# Patient Record
(demographics unavailable — no encounter records)

---

## 2025-02-01 NOTE — ADDENDUM
[FreeTextEntry1] :  Melissa KULKARNI assisted in documentation on 01/21/2025  acting as a scribe for Dr. Lucas Colon.

## 2025-02-01 NOTE — END OF VISIT
[FreeTextEntry3] : Parts of this note were generated by using [employer] services and Dragon medical dictation software.  A reasonable effort was made to proofread and correct its contents, but typos and mistakes may still remain. If there are any questions or points of clarification needed, please contact my office.   Prior to appointment and during encounter with patient extensive medical records were reviewed including but not limited to, hospital records, outpatient records, imaging results and lab data if available. During this appointment the patient was examined, diagnoses were discussed and explained in a face-to-face manner. In addition, extensive time was spent reviewing aforementioned diagnostic studies. Counseling including test results, differential diagnoses, treatment options, risk and benefits, lifestyle changes, current condition, and current medications was performed. Patient's questions and concerns were addressed. Patient verbalized understanding of the treatment plan. Time spent is for reviewing chart, labs and images if available, counseling and care coordination.  [Time Spent: ___ minutes] : I have spent [unfilled] minutes of time on the encounter which excludes teaching and separately reported services.

## 2025-02-01 NOTE — HISTORY OF PRESENT ILLNESS
[FreeTextEntry1] : Primary care physician: Dr. Ashish Alfredo   01/21/2025: 65-year-old male presents for follow-up.  Has not followed with PCP in a long time.  Since last visit in 09/2024, patient has been taking Flomax two capsules and Finasteride. Has not had any other bother with urination.   Reports reasonable stream, daytime frequency 5-6 times every 2-4 hours depending on fluids. Nocturia 1 time.  Does not have hesitancy or bother with urination.  Is able to attain and maintain erections. However, does not have any ejaculation.   Also has prolonged ejaculation.   Father has history of prostate cancer.   Had trial of void 9/24/2024.   Seen on 9/20/2024 for urinary retention.   Patient again had developed urinary retention. After last visit on 9/17/2024 when he had trial of void patient became sexually active and after activity developed urinary retention.  Again, went to [facility] and indwelling Romero catheter was placed.  Seen on 9/10/2024 for urinary retention. Mentioned that last week had the flu/COVID and was taking cold medicine.  Last night, patient was unable to urinate and went to [facility] this morning where Romero catheter was inserted.   Patient was taking Flomax and Finasteride, and urination was the same. No change until this happened.    At this point, patient would like to consider surgical options for his enlarged prostate.   In the past: On Flomax and Finasteride.  Reported variable stream, daytime frequency 5 to 6 x and nocturia 1 x.  Denied hesitancy, straining, intermittency, urgency, incontinence, sense of incomplete emptying.   Had trial of void on 10/06/2023. Since then, urination is back to baseline.   Seen on 10/06/2023 for urinary retention. Patient was taking over the counter protein product, felt stream was slowing.  Went into urinary retention and Romero catheter placed 10 days ago.  No longer taking over-the-counter protein product.  Taking Flomax and Finasteride,  Romero catheter attached to leg bag draining clean urine.   Seen on 3/7/2023. On Flomax and Finasteride (regularly since 9/22/22).  Had reasonable stream, daytime frequency every 2 to 3 hours or so, nocturia 1 to 2 x.  Denied hesitancy, straining, intermittency, urgency, incontinence, sense of incomplete emptying.  Denied dysuria, hematuria, lower abdominal or flank pain, nausea, vomiting, fever, chills or rigors.  Normal libido and erections.  Complained of on and off not having ejaculation.   Has history of Urinary retention in the past. Father has Prostate cancer.   Followed with Dr Bruce in the past.

## 2025-02-01 NOTE — ASSESSMENT
[FreeTextEntry1] : Benign Prostatic Hyperplasia: Patient was planned to undergo cystoscopy and prostate ultrasound today.  However, at this time, patient is questioning if he needs to proceed, considering that he has not had any issues.   PVR today was 34 ml.  Will get urinalysis and urine culture.  Will get BMP.  Discussed treatment options. Will continue Flomax two capsules and Finasteride.    PSA screening: Discussed PSA screening and latest recommendations/guidelines: USPTF and AUA. Mentioned as per comprehensive review and meta-analysis digital rectal exam exhibited a notably low diagnostic value for prostate cancer detection. With suggestion that it could be potentially omitted. Patient was agreeable with not doing digital rectal examination.  Will continue PSA screening.  Will get PSA.  Return to office in 6 months or sooner if there are any issues. Will do uroflow and check PVR.

## 2025-02-01 NOTE — PHYSICAL EXAM
[Normal Appearance] : normal appearance [General Appearance - In No Acute Distress] : no acute distress [] : no respiratory distress [Oriented To Time, Place, And Person] : oriented to person, place, and time [FreeTextEntry1] : normal peripheral circulation  [Abdomen Soft] : soft [Abdomen Tenderness] : non-tender [de-identified] : Romero to leg bag draining clear urine

## 2025-02-01 NOTE — END OF VISIT
[FreeTextEntry3] : Parts of this note were generated by using [employer] services and Dragon medical dictation software.  A reasonable effort was made to proofread and correct its contents, but typos and mistakes may still remain. If there are any questions or points of clarification needed, please contact my office.   Prior to appointment and during encounter with patient extensive medical records were reviewed including but not limited to, hospital records, outpatient records, imaging results and lab data if available. During this appointment the patient was examined, diagnoses were discussed and explained in a face-to-face manner. In addition, extensive time was spent reviewing aforementioned diagnostic studies. Counseling including test results, differential diagnoses, treatment options, risk and benefits, lifestyle changes, current condition, and current medications was performed. Patient's questions and concerns were addressed. Patient verbalized understanding of the treatment plan. Time spent is for reviewing chart, labs and images if available, counseling and care coordination.  [Time Spent: ___ minutes] : I have spent [unfilled] minutes of time on the encounter which excludes teaching and separately reported services.

## 2025-02-01 NOTE — HISTORY OF PRESENT ILLNESS
[FreeTextEntry1] : Primary care physician: Dr. Ashish Alfredo   01/21/2025: 65-year-old male presents for follow-up.  Has not followed with PCP in a long time.  Since last visit in 09/2024, patient has been taking Flomax two capsules and Finasteride. Has not had any other bother with urination.   Reports reasonable stream, daytime frequency 5-6 times every 2-4 hours depending on fluids. Nocturia 1 time.  Does not have hesitancy or bother with urination.  Is able to attain and maintain erections. However, does not have any ejaculation.   Also has prolonged ejaculation.   Father has history of prostate cancer.   Had trial of void 9/24/2024.   Seen on 9/20/2024 for urinary retention.   Patient again had developed urinary retention. After last visit on 9/17/2024 when he had trial of void patient became sexually active and after activity developed urinary retention.  Again, went to [facility] and indwelling Romero catheter was placed.  Seen on 9/10/2024 for urinary retention. Mentioned that last week had the flu/COVID and was taking cold medicine.  Last night, patient was unable to urinate and went to [facility] this morning where Romero catheter was inserted.   Patient was taking Flomax and Finasteride, and urination was the same. No change until this happened.    At this point, patient would like to consider surgical options for his enlarged prostate.   In the past: On Flomax and Finasteride.  Reported variable stream, daytime frequency 5 to 6 x and nocturia 1 x.  Denied hesitancy, straining, intermittency, urgency, incontinence, sense of incomplete emptying.   Had trial of void on 10/06/2023. Since then, urination is back to baseline.   Seen on 10/06/2023 for urinary retention. Patient was taking over the counter protein product, felt stream was slowing.  Went into urinary retention and Romero catheter placed 10 days ago.  No longer taking over-the-counter protein product.  Taking Flomax and Finasteride,  Romero catheter attached to leg bag draining clean urine.   Seen on 3/7/2023. On Flomax and Finasteride (regularly since 9/22/22).  Had reasonable stream, daytime frequency every 2 to 3 hours or so, nocturia 1 to 2 x.  Denied hesitancy, straining, intermittency, urgency, incontinence, sense of incomplete emptying.  Denied dysuria, hematuria, lower abdominal or flank pain, nausea, vomiting, fever, chills or rigors.  Normal libido and erections.  Complained of on and off not having ejaculation.   Has history of Urinary retention in the past. Father has Prostate cancer.   Followed with Dr Bruce in the past.

## 2025-02-01 NOTE — PHYSICAL EXAM
[Normal Appearance] : normal appearance [General Appearance - In No Acute Distress] : no acute distress [] : no respiratory distress [Oriented To Time, Place, And Person] : oriented to person, place, and time [FreeTextEntry1] : normal peripheral circulation  [Abdomen Soft] : soft [Abdomen Tenderness] : non-tender [de-identified] : Romero to leg bag draining clear urine

## 2025-03-11 NOTE — REVIEW OF SYSTEMS
Normal result letter created and pended   [Regular Periods] : regular periods [Headaches] : no headaches [Polyuria] : no polyuria [Polydipsia] : no polydipsia [Constipation] : no constipation [Fatigue] : no fatigue [Abdominal Pain] : no abdominal pain [see HPI] : see HPI [Negative] : Heme/Lymph [Vomiting] : no vomiting [FreeTextEntry2] : Ted Costello is a now 19 year 2 month old female here for follow-up of Graves' disease. \par She was first seen by my colleague Dr. Hollingsworth June 30, 2017 and transferred care to me in [place]. She was found to have high HR and weight loss and results were consistent with thyrotoxicosis. Labs showed hyperthyroidism and positive TSI and TSHrAb with Dr. Hollingsworth and after discussion they were interested in medication treatment with atenolol and methimazole. Initially she had required multiple dosage increase, and I reviewed if her compliance is significantly poor it is akin to failure of medical management after which she has had good compliance. Atenolol was stopped after 9/11/17 and her methimazole has decreased to 5 mg daily since November 2017. I last saw her 4/15/2019 for follow-up, when her results showed TSH was slightly elevated and I recommended to decrease to 2.5 mg daily of methimazole. I did discuss 2 years after treatment we can trial wean but did not say she should stop treatment.\par July 2019 she missed several appointments, reportedly due to school activities but this was during the summer time. I reviewed she was adult age when they called, and I reviewed she needed to stay consistent with treatment and see adult endocrine. \par Subsequently they made a few other appointments but she again missed them. She was supposed to present 2 weeks ago but due to COVID19 pandemic her appointment was erroneously cancelled. \par \par Today mother's first question is whether she has hypo or hyperthyroidism. Mother reported that she has not taken the medication recently, and Ted Costello admits she has not taken it since 6 months ago. She states she ran out and admits did not tell mother or contact our office. She states she did not have good reason for missing the more recent appointments she just did not get out of bed on time. I asked if she does want to stay healthy she states she does. \par Mother states she has heard with this she cannot get pregnant and wants to know if it is true.  [FreeTextEntry1] : Menarche at 13 years of age, Has been monthly LMP 3/21

## 2025-03-17 NOTE — ADDENDUM
[FreeTextEntry1] :  Melissa KULKARNI assisted in documentation on 03/11/2025  acting as a scribe for Dr. Lucas Colon.

## 2025-03-17 NOTE — ASSESSMENT
[FreeTextEntry1] : Benign Prostatic Hyperplasia: Discussed treatment options.  Reviewed the images.  Discussed concern for enlarged median lobe and its effect. Discussed treatment options, including surgical procedures: robotic assisted laparoscopic simple prostatectomy versus holmium laser enucleation of prostate versus aquablation.   Risks versus benefits of each options discussed.   At this time, patient will continue Flomax two capsules and Finasteride.   Recommended to take Flomax after his usual time of sexual activity.     Elevated PSA: Discussed MRI prostate findings.  Will continue PSA monitoring: will do free and total PSA before follow-up appointment.   Return to office in 6 months or sooner if there are any issues. Will do uroflow and check PVR.

## 2025-03-17 NOTE — HISTORY OF PRESENT ILLNESS
[FreeTextEntry1] : Primary care physician: Dr. sAhish Alfredo   03/11/2025: 65-year-old male presents for discussion of MRI prostate.    After last visit, patient did do labs including PSA, which was elevated. Patient subsequently had MRI prostate. MRI prostate 02/28/2025: Prostate volume: 143 cc No MRI-targetable lesion. PIRADS 2.  01/22/2025:  Urine culture: no growth.  Creatinine: 0.90  Urinalysis: negative   PSA: 3.48/24%.  Corrected PSA: 6.96 considering patient is on Finasteride.    Patient is taking Flomax two capsules and Finasteride.  Has variable stream. Daytime frequency every 2-3 hours and nocturia 1-2 times.   No other bother.  Denies dysuria, hematuria, lower abdominal or flank pain, nausea, vomiting, fever, chills or rigors.  Patient reports no ejaculation since going on Flomax two capsules. Patient takes Flomax in the morning and sexual activity is usually in the afternoon.    Seen on 01/21/2025 for follow-up.  On Flomax two capsules and Finasteride. Had reasonable stream, daytime frequency 5-6 times every 2-4 hours depending on fluids. Nocturia 1 time.  Did not have hesitancy or bother with urination.  Is able to attain and maintain erections. However, did not have any ejaculation.   Also had prolonged ejaculation.   Father had history of prostate cancer.   Had trial of void 9/24/2024.   Seen on 9/20/2024 for urinary retention.   Patient again had developed urinary retention. After last visit on 9/17/2024 when he had trial of void patient became sexually active and after activity developed urinary retention.  Again, went to [facility] and indwelling Romero catheter was placed.  Seen on 9/10/2024 for urinary retention. Mentioned that last week had the flu/COVID and was taking cold medicine.  Last night, patient was unable to urinate and went to [facility] this morning where Romero catheter was inserted.   Patient was taking Flomax and Finasteride, and urination was the same. No change until this happened.    At this point, patient would like to consider surgical options for his enlarged prostate.   In the past: On Flomax and Finasteride.  Reported variable stream, daytime frequency 5 to 6 x and nocturia 1 x.  Denied hesitancy, straining, intermittency, urgency, incontinence, sense of incomplete emptying.   Had trial of void on 10/06/2023. Since then, urination is back to baseline.   Seen on 10/06/2023 for urinary retention. Patient was taking over the counter protein product, felt stream was slowing.  Went into urinary retention and Romero catheter placed 10 days ago.  No longer taking over-the-counter protein product.  Taking Flomax and Finasteride,  Romero catheter attached to leg bag draining clean urine.   Seen on 3/7/2023. On Flomax and Finasteride (regularly since 9/22/22).  Had reasonable stream, daytime frequency every 2 to 3 hours or so, nocturia 1 to 2 x.  Denied hesitancy, straining, intermittency, urgency, incontinence, sense of incomplete emptying.  Denied dysuria, hematuria, lower abdominal or flank pain, nausea, vomiting, fever, chills or rigors.  Normal libido and erections.  Complained of on and off not having ejaculation.   Has history of Urinary retention in the past. Father has Prostate cancer.   Followed with Dr Bruce in the past.

## 2025-03-17 NOTE — HISTORY OF PRESENT ILLNESS
[FreeTextEntry1] : Primary care physician: Dr. Ashish Alfredo   03/11/2025: 65-year-old male presents for discussion of MRI prostate.    After last visit, patient did do labs including PSA, which was elevated. Patient subsequently had MRI prostate. MRI prostate 02/28/2025: Prostate volume: 143 cc No MRI-targetable lesion. PIRADS 2.  01/22/2025:  Urine culture: no growth.  Creatinine: 0.90  Urinalysis: negative   PSA: 3.48/24%.  Corrected PSA: 6.96 considering patient is on Finasteride.    Patient is taking Flomax two capsules and Finasteride.  Has variable stream. Daytime frequency every 2-3 hours and nocturia 1-2 times.   No other bother.  Denies dysuria, hematuria, lower abdominal or flank pain, nausea, vomiting, fever, chills or rigors.  Patient reports no ejaculation since going on Flomax two capsules. Patient takes Flomax in the morning and sexual activity is usually in the afternoon.    Seen on 01/21/2025 for follow-up.  On Flomax two capsules and Finasteride. Had reasonable stream, daytime frequency 5-6 times every 2-4 hours depending on fluids. Nocturia 1 time.  Did not have hesitancy or bother with urination.  Is able to attain and maintain erections. However, did not have any ejaculation.   Also had prolonged ejaculation.   Father had history of prostate cancer.   Had trial of void 9/24/2024.   Seen on 9/20/2024 for urinary retention.   Patient again had developed urinary retention. After last visit on 9/17/2024 when he had trial of void patient became sexually active and after activity developed urinary retention.  Again, went to [facility] and indwelling Romero catheter was placed.  Seen on 9/10/2024 for urinary retention. Mentioned that last week had the flu/COVID and was taking cold medicine.  Last night, patient was unable to urinate and went to [facility] this morning where Romero catheter was inserted.   Patient was taking Flomax and Finasteride, and urination was the same. No change until this happened.    At this point, patient would like to consider surgical options for his enlarged prostate.   In the past: On Flomax and Finasteride.  Reported variable stream, daytime frequency 5 to 6 x and nocturia 1 x.  Denied hesitancy, straining, intermittency, urgency, incontinence, sense of incomplete emptying.   Had trial of void on 10/06/2023. Since then, urination is back to baseline.   Seen on 10/06/2023 for urinary retention. Patient was taking over the counter protein product, felt stream was slowing.  Went into urinary retention and Romero catheter placed 10 days ago.  No longer taking over-the-counter protein product.  Taking Flomax and Finasteride,  Romero catheter attached to leg bag draining clean urine.   Seen on 3/7/2023. On Flomax and Finasteride (regularly since 9/22/22).  Had reasonable stream, daytime frequency every 2 to 3 hours or so, nocturia 1 to 2 x.  Denied hesitancy, straining, intermittency, urgency, incontinence, sense of incomplete emptying.  Denied dysuria, hematuria, lower abdominal or flank pain, nausea, vomiting, fever, chills or rigors.  Normal libido and erections.  Complained of on and off not having ejaculation.   Has history of Urinary retention in the past. Father has Prostate cancer.   Followed with Dr Bruce in the past.

## 2025-03-17 NOTE — PHYSICAL EXAM
[Normal Appearance] : normal appearance [General Appearance - In No Acute Distress] : no acute distress [] : no respiratory distress [Oriented To Time, Place, And Person] : oriented to person, place, and time [Abdomen Soft] : soft [Abdomen Tenderness] : non-tender [FreeTextEntry1] : normal peripheral circulation  [de-identified] : Romero to leg bag draining clear urine

## 2025-03-17 NOTE — PHYSICAL EXAM
[Normal Appearance] : normal appearance [General Appearance - In No Acute Distress] : no acute distress [] : no respiratory distress [Oriented To Time, Place, And Person] : oriented to person, place, and time [Abdomen Soft] : soft [Abdomen Tenderness] : non-tender [FreeTextEntry1] : normal peripheral circulation  [de-identified] : Romero to leg bag draining clear urine

## 2025-03-17 NOTE — END OF VISIT
[Time Spent: ___ minutes] : I have spent [unfilled] minutes of time on the encounter which excludes teaching and separately reported services. [FreeTextEntry3] : Parts of this note were generated by using [employer] services and Dragon medical dictation software.  A reasonable effort was made to proofread and correct its contents, but typos and mistakes may still remain. If there are any questions or points of clarification needed, please contact my office.   Prior to appointment and during encounter with patient extensive medical records were reviewed including but not limited to, hospital records, outpatient records, imaging results and lab data if available. During this appointment the patient was examined, diagnoses were discussed and explained in a face-to-face manner. In addition, extensive time was spent reviewing aforementioned diagnostic studies. Counseling including test results, differential diagnoses, treatment options, risk and benefits, lifestyle changes, current condition, and current medications was performed. Patient's questions and concerns were addressed. Patient verbalized understanding of the treatment plan. Time spent is for reviewing chart, labs and images if available, counseling and care coordination.

## 2025-03-17 NOTE — END OF VISIT
[Time Spent: ___ minutes] : I have spent [unfilled] minutes of time on the encounter which excludes teaching and separately reported services. [FreeTextEntry3] : Parts of this note were generated by using [employer] services and Dragon medical dictation software.  A reasonable effort was made to proofread and correct its contents, but typos and mistakes may still remain. If there are any questions or points of clarification needed, please contact my office.   Prior to appointment and during encounter with patient extensive medical records were reviewed including but not limited to, hospital records, outpatient records, imaging results and lab data if available. During this appointment the patient was examined, diagnoses were discussed and explained in a face-to-face manner. In addition, extensive time was spent reviewing aforementioned diagnostic studies. Counseling including test results, differential diagnoses, treatment options, risk and benefits, lifestyle changes, current condition, and current medications was performed. Patient's questions and concerns were addressed. Patient verbalized understanding of the treatment plan. Time spent is for reviewing chart, labs and images if available, counseling and care coordination.

## 2025-05-28 NOTE — HISTORY OF PRESENT ILLNESS
[FreeTextEntry1] : Primary care physician: Dr. Ashish Alfredo   05/28/2025: Previous history as documented below by Dr. Colon.  Patient reports 2 weeks ago he was in [place] and he developed urinary retention.  He went to the hospital and had a catheter placed which released about 5 to 600 mL of urine.  Has a history of urinary retention in the past with catheter placement.  He is in discussions with Dr. Colon for possible aqua ablation for his very large prostate.  He has been continuing on the tamsulosin 0.8 mg daily and finasteride 5 mg daily.  Urine has been clear however foul smelling.  03/11/2025: 65-year-old male presents for discussion of MRI prostate.    After last visit, patient did do labs including PSA, which was elevated. Patient subsequently had MRI prostate. MRI prostate 02/28/2025: Prostate volume: 143 cc No MRI-targetable lesion. PIRADS 2.  01/22/2025:  Urine culture: no growth.  Creatinine: 0.90  Urinalysis: negative   PSA: 3.48/24%.  Corrected PSA: 6.96 considering patient is on Finasteride.    Patient is taking Flomax two capsules and Finasteride.  Has variable stream. Daytime frequency every 2-3 hours and nocturia 1-2 times.   No other bother.  Denies dysuria, hematuria, lower abdominal or flank pain, nausea, vomiting, fever, chills or rigors.  Patient reports no ejaculation since going on Flomax two capsules. Patient takes Flomax in the morning and sexual activity is usually in the afternoon.    Seen on 01/21/2025 for follow-up.  On Flomax two capsules and Finasteride. Had reasonable stream, daytime frequency 5-6 times every 2-4 hours depending on fluids. Nocturia 1 time.  Did not have hesitancy or bother with urination.  Is able to attain and maintain erections. However, did not have any ejaculation.   Also had prolonged ejaculation.   Father had history of prostate cancer.   Had trial of void 9/24/2024.   Seen on 9/20/2024 for urinary retention.   Patient again had developed urinary retention. After last visit on 9/17/2024 when he had trial of void patient became sexually active and after activity developed urinary retention.  Again, went to [facility] and indwelling Romero catheter was placed.  Seen on 9/10/2024 for urinary retention. Mentioned that last week had the flu/COVID and was taking cold medicine.  Last night, patient was unable to urinate and went to [facility] this morning where Romero catheter was inserted.   Patient was taking Flomax and Finasteride, and urination was the same. No change until this happened.    At this point, patient would like to consider surgical options for his enlarged prostate.   In the past: On Flomax and Finasteride.  Reported variable stream, daytime frequency 5 to 6 x and nocturia 1 x.  Denied hesitancy, straining, intermittency, urgency, incontinence, sense of incomplete emptying.   Had trial of void on 10/06/2023. Since then, urination is back to baseline.   Seen on 10/06/2023 for urinary retention. Patient was taking over the counter protein product, felt stream was slowing.  Went into urinary retention and Romero catheter placed 10 days ago.  No longer taking over-the-counter protein product.  Taking Flomax and Finasteride,  Romero catheter attached to leg bag draining clean urine.   Seen on 3/7/2023. On Flomax and Finasteride (regularly since 9/22/22).  Had reasonable stream, daytime frequency every 2 to 3 hours or so, nocturia 1 to 2 x.  Denied hesitancy, straining, intermittency, urgency, incontinence, sense of incomplete emptying.  Denied dysuria, hematuria, lower abdominal or flank pain, nausea, vomiting, fever, chills or rigors.  Normal libido and erections.  Complained of on and off not having ejaculation.   Has history of Urinary retention in the past. Father has Prostate cancer.   Followed with Dr Bruce in the past.

## 2025-05-28 NOTE — PHYSICAL EXAM
[Normal Appearance] : normal appearance [General Appearance - In No Acute Distress] : no acute distress [] : no respiratory distress [Oriented To Time, Place, And Person] : oriented to person, place, and time [Abdomen Soft] : soft [Abdomen Tenderness] : non-tender [FreeTextEntry1] : normal peripheral circulation  [de-identified] : Romero to leg bag draining clear urine

## 2025-05-28 NOTE — ASSESSMENT
[FreeTextEntry1] : Benign Prostatic Hyperplasia Acute urinary retention - TOV performed today in the office, see note. Given antibiotic cipro x1.  - Patient advised to return to office if unable to urinate - Continue tamsulosin and finasteride - Schedule office cystoscopy with Dr. Colon for aquablation planning.

## 2025-06-25 NOTE — ASSESSMENT
[FreeTextEntry1] : Urinary retention: Benign Prostatic Hyperplasia: Performed cystoscopy: The bladder wall demonstrated a grade 2 trabeculation.    Tri lobar prostatic enlargement: moderately enlarged lateral lobes and a medium right-sided median lobe, with coaptation. Erythematous and edematous bladder mucosa all throughout bladder cavity with hypertrophied mucosa along posterior bladder wall. During cystoscopy instilled 300 ml of sterile water and patient felt full. Subsequently patient was able to urinate 200 ml with reasonable stream. Will continue Levofloxacin for another day. Discussed treatment options. Patient interested in Aquablation: Transurethral water jet ablation of prostate. Discussed it is a newer technique with limited data. Discussed risks, benefits and alternatives. Discussed the procedure and the post operative course. Discussed I am doing the procedures at [facility]. Discussed Patient will need pre-surgical appointment at the Hospital and medical clearance from Primary care physician before the procedure. Discussed that Anesthesia team will discuss the risks and complications of the anesthesia in detail separately. Will schedule for August OR date. Will continue Flomax 2 capsules and Finasteride until then.

## 2025-06-25 NOTE — ASSESSMENT
[FreeTextEntry1] : Urinary retention: Benign Prostatic Hyperplasia: Performed cystoscopy: The bladder wall demonstrated a grade 2 trabeculation.    Tri lobar prostatic enlargement: moderately enlarged lateral lobes and a medium right-sided median lobe, with coaptation. Erythematous and edematous bladder mucosa all throughout bladder cavity with hypertrophied mucosa along posterior bladder wall. During cystoscopy instilled 300 ml of sterile water and patient felt full. Subsequently patient was able to urinate 200 ml with reasonable stream. Will continue Levofloxacin for another day. Discussed treatment options. Patient interested in Aquablation: Transurethral water jet ablation of prostate. Discussed it is a newer technique with limited data. Discussed risks, benefits and alternatives. Discussed the procedure and the post operative course. Discussed I am doing the procedures at [facility]. Discussed Patient will need pre-surgical appointment at the Hospital and medical clearance from Primary care physician before the procedure. Discussed that Anesthesia team will discuss the risks and complications of the anesthesia in detail separately. Will schedule for August OR date. Will continue Flomax 2 capsules and Finasteride until then.

## 2025-06-25 NOTE — PHYSICAL EXAM
[Abdomen Soft] : soft [Abdomen Tenderness] : non-tender [Normal Appearance] : normal appearance [General Appearance - In No Acute Distress] : no acute distress [Normal Station and Gait] : the gait and station were normal for the patient's age [Oriented To Time, Place, And Person] : oriented to person, place, and time [de-identified] : normal peripheral circulation  [de-identified] : Romero to leg bag draining clear urine

## 2025-06-25 NOTE — HISTORY OF PRESENT ILLNESS
[FreeTextEntry1] : Primary care physician: Dr. Ashish Alfredo   6/17/2025: 65-year-old male presents for cystoscopy and trial of void. Since last visit patient has had 2 episodes of urinary retention. Went to [facility] on 6/10/2025 and Romero was reinserted. Urine culture was negative for infection. Taking Flomax 2 capsules and Finasteride. Romero to leg bag draining clear urine. Started Levofloxacin yesterday.  Seen on 03/11/2025 for discussion of MRI prostate.    PSA: 3.48/24%, corrected PSA: 6.96 considering patient on finasteride (1/22/2025). MRI prostate (02/28/2025): Prostate volume: 143 cc. Enlarged prostate gland with central prostatic hypertrophy. No MRI-targetable lesion. PIRADS 2.  01/22/2025:  Urine culture: no growth.  Creatinine: 0.90  Urinalysis: negative   PSA: 3.48/24%.  Corrected PSA: 6.96 considering patient is on Finasteride.    Patient is taking Flomax two capsules and Finasteride.  Has variable stream. Daytime frequency every 2-3 hours and nocturia 1-2 times.   No other bother.  Denies dysuria, hematuria, lower abdominal or flank pain, nausea, vomiting, fever, chills or rigors.  Patient reports no ejaculation since going on Flomax two capsules. Patient takes Flomax in the morning and sexual activity is usually in the afternoon.    Seen on 01/21/2025 for follow-up.  On Flomax two capsules and Finasteride. Had reasonable stream, daytime frequency 5-6 times every 2-4 hours depending on fluids. Nocturia 1 time.  Did not have hesitancy or bother with urination.  Is able to attain and maintain erections. However, did not have any ejaculation.   Also had prolonged ejaculation.   Father had history of prostate cancer.   Had trial of void 9/24/2024.   Seen on 9/20/2024 for urinary retention.   Patient again had developed urinary retention. After last visit on 9/17/2024 when he had trial of void patient became sexually active and after activity developed urinary retention.  Again, went to [facility] and indwelling Romero catheter was placed.  Seen on 9/10/2024 for urinary retention. Mentioned that last week had the flu/COVID and was taking cold medicine.  Last night, patient was unable to urinate and went to [facility] this morning where Romero catheter was inserted.   Patient was taking Flomax and Finasteride, and urination was the same. No change until this happened.    At this point, patient would like to consider surgical options for his enlarged prostate.   In the past: On Flomax and Finasteride.  Reported variable stream, daytime frequency 5 to 6 x and nocturia 1 x.  Denied hesitancy, straining, intermittency, urgency, incontinence, sense of incomplete emptying.   Had trial of void on 10/06/2023. Since then, urination is back to baseline.   Seen on 10/06/2023 for urinary retention. Patient was taking over the counter protein product, felt stream was slowing.  Went into urinary retention and Romero catheter placed 10 days ago.  No longer taking over-the-counter protein product.  Taking Flomax and Finasteride,  Romero catheter attached to leg bag draining clean urine.   Seen on 3/7/2023. On Flomax and Finasteride (regularly since 9/22/22).  Had reasonable stream, daytime frequency every 2 to 3 hours or so, nocturia 1 to 2 x.  Denied hesitancy, straining, intermittency, urgency, incontinence, sense of incomplete emptying.  Denied dysuria, hematuria, lower abdominal or flank pain, nausea, vomiting, fever, chills or rigors.  Normal libido and erections.  Complained of on and off not having ejaculation.   Has history of Urinary retention in the past. Father has Prostate cancer.   Followed with Dr Bruce in the past.

## 2025-06-25 NOTE — END OF VISIT
[Time Spent: ___ minutes] : I have spent [unfilled] minutes of time on the encounter which excludes teaching and separately reported services. [FreeTextEntry3] : Parts of this note were generated by using Dragon medical dictation software.  A reasonable effort was made to proofread and correct its contents, but typos and mistakes may still remain. If there are any questions or points of clarification needed, please contact my office.   Prior to appointment and during encounter with patient extensive medical records were reviewed including but not limited to, hospital records, outpatient records, imaging results and lab data if available. During this appointment the patient was examined, diagnoses were discussed and explained in a face-to-face manner. In addition, extensive time was spent reviewing aforementioned diagnostic studies. Counseling including test results, differential diagnoses, treatment options, risk and benefits, lifestyle changes, current condition, and current medications was performed. Patient's questions and concerns were addressed. Patient verbalized understanding of the treatment plan. Time spent is for reviewing chart, labs and images if available, counseling and care coordination.

## 2025-06-25 NOTE — LETTER BODY
[Dear  ___] : Dear  [unfilled], [Courtesy Letter:] : I had the pleasure of seeing your patient, [unfilled], in my office today. [Please see my note below.] : Please see my note below. [Sincerely,] : Sincerely, [FreeTextEntry3] : Lucas Colon MD  of Urology [facility]  [facility] Offices: [address] [facility], [address] [place] [address], [facility] [address] [facility], [place] [address]  TEL: [phone] FAX: [phone]

## 2025-06-25 NOTE — HISTORY OF PRESENT ILLNESS
[FreeTextEntry1] : Primary care physician: Dr. Ashish Alfredo   6/17/2025: 65-year-old male presents for cystoscopy and trial of void. Since last visit patient has had 2 episodes of urinary retention. Went to [facility] on 6/10/2025 and Roemro was reinserted. Urine culture was negative for infection. Taking Flomax 2 capsules and Finasteride. Romero to leg bag draining clear urine. Started Levofloxacin yesterday.  Seen on 03/11/2025 for discussion of MRI prostate.    PSA: 3.48/24%, corrected PSA: 6.96 considering patient on finasteride (1/22/2025). MRI prostate (02/28/2025): Prostate volume: 143 cc. Enlarged prostate gland with central prostatic hypertrophy. No MRI-targetable lesion. PIRADS 2.  01/22/2025:  Urine culture: no growth.  Creatinine: 0.90  Urinalysis: negative   PSA: 3.48/24%.  Corrected PSA: 6.96 considering patient is on Finasteride.    Patient is taking Flomax two capsules and Finasteride.  Has variable stream. Daytime frequency every 2-3 hours and nocturia 1-2 times.   No other bother.  Denies dysuria, hematuria, lower abdominal or flank pain, nausea, vomiting, fever, chills or rigors.  Patient reports no ejaculation since going on Flomax two capsules. Patient takes Flomax in the morning and sexual activity is usually in the afternoon.    Seen on 01/21/2025 for follow-up.  On Flomax two capsules and Finasteride. Had reasonable stream, daytime frequency 5-6 times every 2-4 hours depending on fluids. Nocturia 1 time.  Did not have hesitancy or bother with urination.  Is able to attain and maintain erections. However, did not have any ejaculation.   Also had prolonged ejaculation.   Father had history of prostate cancer.   Had trial of void 9/24/2024.   Seen on 9/20/2024 for urinary retention.   Patient again had developed urinary retention. After last visit on 9/17/2024 when he had trial of void patient became sexually active and after activity developed urinary retention.  Again, went to [facility] and indwelling Romero catheter was placed.  Seen on 9/10/2024 for urinary retention. Mentioned that last week had the flu/COVID and was taking cold medicine.  Last night, patient was unable to urinate and went to [facility] this morning where Romero catheter was inserted.   Patient was taking Flomax and Finasteride, and urination was the same. No change until this happened.    At this point, patient would like to consider surgical options for his enlarged prostate.   In the past: On Flomax and Finasteride.  Reported variable stream, daytime frequency 5 to 6 x and nocturia 1 x.  Denied hesitancy, straining, intermittency, urgency, incontinence, sense of incomplete emptying.   Had trial of void on 10/06/2023. Since then, urination is back to baseline.   Seen on 10/06/2023 for urinary retention. Patient was taking over the counter protein product, felt stream was slowing.  Went into urinary retention and Romero catheter placed 10 days ago.  No longer taking over-the-counter protein product.  Taking Flomax and Finasteride,  Romero catheter attached to leg bag draining clean urine.   Seen on 3/7/2023. On Flomax and Finasteride (regularly since 9/22/22).  Had reasonable stream, daytime frequency every 2 to 3 hours or so, nocturia 1 to 2 x.  Denied hesitancy, straining, intermittency, urgency, incontinence, sense of incomplete emptying.  Denied dysuria, hematuria, lower abdominal or flank pain, nausea, vomiting, fever, chills or rigors.  Normal libido and erections.  Complained of on and off not having ejaculation.   Has history of Urinary retention in the past. Father has Prostate cancer.   Followed with Dr Bruce in the past.

## 2025-06-25 NOTE — PHYSICAL EXAM
[Abdomen Soft] : soft [Abdomen Tenderness] : non-tender [Normal Appearance] : normal appearance [General Appearance - In No Acute Distress] : no acute distress [Normal Station and Gait] : the gait and station were normal for the patient's age [Oriented To Time, Place, And Person] : oriented to person, place, and time [de-identified] : normal peripheral circulation  [de-identified] : Romero to leg bag draining clear urine

## 2025-06-25 NOTE — LETTER BODY
[Dear  ___] : Dear  [unfilled], [Courtesy Letter:] : I had the pleasure of seeing your patient, [unfilled], in my office today. [Please see my note below.] : Please see my note below. [Sincerely,] : Sincerely, [FreeTextEntry3] : Lucas Colon MD  of Urology [facility]  [facility] Offices: [address] [facility], [address] [place] [address], Count includes the [facility] [address] [facility], [place] [address]  TEL: [phone] FAX: [phone]